# Patient Record
Sex: FEMALE | Race: ASIAN
[De-identification: names, ages, dates, MRNs, and addresses within clinical notes are randomized per-mention and may not be internally consistent; named-entity substitution may affect disease eponyms.]

---

## 2019-10-04 NOTE — PRE-PROCEDURE NOTE/ATTESTATION
Pre-Procedure Note/Attestation


Complete Prior to Procedure


Planned Procedure:  not applicable


Procedure Narrative:


D&C, Hysteroscopy





Indications for Procedure


Pre-Operative Diagnosis:


Uterine prolapse, vaginal bleeding





Attestation


I attest that I discussed the nature of the procedure; its benefits; risks and 

complications; and alternatives (and the risks and benefits of such alternatives

), prior to the procedure, with the patient (or the patient's legal 

representative).





I attest that, if there was a reasonable possibility of needing a blood 

transfusion, the patient (or the patient's legal representative) was given the 

[facility] Services standardized written summary, pursuant 

to the [place] Blood Safety Act ([place] Health and Safety Code # 1645, as 

amended).





I attest that I re-evaluated the patient just prior to the surgery and that 

there has been no change in the patient's H&P, except as documented below:NONE











Simeon Tucker MD Oct 4, 2019 10:14

## 2019-10-04 NOTE — OPERATIVE NOTE - DICTATED
DATE OF OPERATION:  10/04/2019

PREOPERATIVE DIAGNOSES:  Menorrhagia and pelvic prolapse.



POSTOPERATIVE DIAGNOSES:  Menorrhagia and pelvic prolapse.



PROCEDURES PERFORMED:  Video hysteroscopy, dilatation and curettage,

endocervical curettage, and endometrial curettage.



SURGEON:  Simeon Tucker M.D.



ASSISTANT:  No assistant.



ANESTHESIA:  General.



ANESTHESIOLOGIST:  Hank Alonso M.D.



PROCEDURE IN DETAIL:  After all the appropriate consents were signed, the

patient was brought to the operating room and placed on the table in

supine position.  General anesthesia was induced without complication.

The patient was then placed in a dorsal lithotomy position.  Perineum,

vagina, and abdomen were prepped and draped in the usual fashion for the

procedure.  The patient was then examined under anesthesia.  Uterus

appeared to be severely prolapsing with both cystocele and enterocele

visible.  The cervix was now grasped and dilated using Hegar dilators to

Hegar #7.  The procedure then continued with video hysteroscope where

uterine cavity was well visualized.  Both tubal ostia could be identified

and there did not appear to be any submucosal fibroids or polypoid

lesions.  The endometrium appeared to be quite dry.  At this time,

endocervical curettage was performed followed by endometrial curettage.

Both specimens submitted to pathology for evaluation.  At this time,

instruments were removed from the vagina and the cervical os was once

again visualized and found to be hemostatic.  The patient was placed in

the supine position, awakened from general anesthesia, and brought to the

recovery room in excellent condition.









  ______________________________________________

  Simeon Tucker M.D.





DR:  ESTEFANÍA

D:  10/04/2019 11:31

T:  10/04/2019 15:59

JOB#:  [id number]

CC:

## 2019-10-04 NOTE — ANETHESIA PREOPERATIVE EVAL
Anesthesia Pre-op PMH/ROS


General


Date of Evaluation:  Oct 4, 2019


Time of Evaluation:  10:18


Anesthesiologist:  Celeste


ASA Score:  ASA 3


Mallampati Score


Class I : Soft palate, uvula, fauces, pillars visible


Class II: Soft palate, uvula, fauces visible


Class III: Soft palate, base of uvula visible


Class IV: Only hard plate visible


Mallampati Classification:  Class II


Surgeon:  Caitlin


Diagnosis:  Uterine prolaps


Surgical Procedure:  D&C Hysteroscopy


Anesthesia History:  none


Family History:  no anesthesia problems


Allergies:  


Coded Allergies:  


     No Known Allergies (Unverified , 10/3/19)


Patient NPO?:  Yes





Past Medical History


Cardiovascular:  Reports: HTN - stable on pills; 


   Denies: CAD, MI, valve dz, arrhythmia, other


Pulmonary:  Denies: asthma, COPD, MIKI, other


Gastrointestinal/Genitourinary:  Reports: GERD - mild; 


   Denies: CRI, ESRD, other


Neurologic/Psychiatric:  Reports: depression/anxiety; 


   Denies: dementia, CVA, TIA, other


Endocrine:  Reports: DM - on pills pooly controlled; 


   Denies: hypothyroidism, steroids, other


HEENT:  Denies: cataract (L), cataract (R), glaucoma, [place] (L), Ione (R), other


Hematology/Immune:  Denies: anemia, DVT, bleeding disorder, other


Musculoskeletal/Integumentary:  Denies: OA, RA, DJD, DDD, edema, other


PMH Narrative:


as above


PSxH Narrative:


none





Anesthesia Pre-op Phys. Exam


Physician Exam





Last Vital Signs








  Date Time  Temp Pulse Resp B/P (MAP) Pulse Ox O2 Delivery O2 Flow Rate FiO2


 


10/4/19 08:50      Room Air  


 


10/4/19 08:50 96.8 50 20 117/65 100   








Constitutional:  NAD


Neurologic:  CN 2-12 intact


Cardiovascular:  RRR, no M/R/G


Respiratory:  CTA


Gastrointestinal:  S/NT/ND





Airway Exam


Mallampati Score:  Class II


MO:  full


Neck:  flexible


ROM:  full


Teeth:  missing


Dentures:  no upper, no lower





Anesthesia Pre-op A/P


Labs


see chart





Studies


Pre-op Studies:  EKG - NSR





Risk Assessment & Plan


Assessment:


ASA 3


Plan:


GA with LMA


Status Change Before Surgery:  No





Pre-Antibiotics


Drug:  Cefoxetin 1gr.


Given Within 1 Hr of Incision:  Yes


Time Given:  10:50











Hank Alonso MD Oct 4, 2019 11:08

## 2019-10-04 NOTE — IMMEDIATE POST-OP EVALUATION
Immediate Post-Op Evalulation


Immediate Post-Op Evalulation


Procedure:  D&C Hysteroscopy


Date of Evaluation:  Oct 4, 2019


Time of Evaluation:  11:33


IV Fluids:  1000


Blood Products:  none


Estimated Blood Loss:  min


Urinary Output:  200


Blood Pressure Systolic:  104


Blood Pressure Diastolic:  56


Pulse Rate:  48


Respiratory Rate:  20


O2 Sat by Pulse Oximetry:  99


Temperature (Fahrenheit):  97.6


Pain Score (1-10):  1


Nausea:  No


Vomiting:  No


Complications


none


Patient Status:  reacts, patent, none


Hydration Status:  adequate











Hank Alonso MD Oct 4, 2019 11:34

## 2019-10-04 NOTE — BRIEF OPERATIVE NOTE
Immediate Post Operative Note


Operative Note


Pre-op Diagnosis:


Uterine prolapse, vaginal bleeding


Procedure:


Video hysteroscopy, ECC, EMC


Post-op Diagnosis:


Same


Post-op Diagnosis:  same as pre-op


Findings:  consistent w/pre-op dx studies


Surgeon:  Simeon Tucker MD


Anesthesiologist:  ISABELL Alonso MD


Anesthesia:  general


Specimen:  yes - ECC, EMC


Complications:  none


Condition:  stable


Fluids:  LR


Estimated Blood Loss:  none


Drains:  none


Implant(s) used?:  No











Simeon Tucker MD Oct 4, 2019 11:32

## 2019-10-08 NOTE — 48 HOUR POST ANESTHESIA EVAL
Post Anesthesia Evaluation


Procedure:  D&C Hysteroscopy


Date of Evaluation:  Oct 4, 2019


Time of Evaluation:  12:30


Blood Pressure Systolic:  128


0:  72


Pulse Rate:  68


Respiratory Rate:  20


Temperature (Fahrenheit):  97.6


O2 Sat by Pulse Oximetry:  98


Airway:  patent


Nausea:  No


Vomiting:  No


Pain Intensity:  1


Hydration Status:  adequate


Cardiopulmonary Status:


stable


Mental Status/LOC:  patient returned to baseline


Follow-up Care/Observations:


n/a


Post-Anesthesia Complications:


none


Follow-up care needed:  ready to discharge











Hank Alonso MD Oct 8, 2019 09:01

## 2019-11-27 NOTE — NUR
*-* CASE MANAGEMENT NOTES *-*



Please  review information for upcoming case Emilia Feng DOS 12/04/19



CERT # [id number]

1DAY LOS

CPT 94398 Laparoscopy, surgical, with vaginal hysterectomy

VERA MARSHALL

NO PHONE #

FX# [phone]

PER LATONIA MCKEON

[phone]



KATIA OF DR AIKEN'S OFFICE OBTAINING AUTH FOR VAGINAL SLING PROCEDURE. WILL UPDATE ONCE 
APPROVED

## 2019-12-04 ENCOUNTER — HOSPITAL ENCOUNTER (INPATIENT)
Dept: HOSPITAL 72 - SDSOVERFLO | Age: 59
LOS: 58 days | Discharge: HOME | DRG: 743 | End: 2020-01-31
Payer: COMMERCIAL

## 2019-12-04 VITALS — DIASTOLIC BLOOD PRESSURE: 78 MMHG | SYSTOLIC BLOOD PRESSURE: 118 MMHG

## 2019-12-04 VITALS — WEIGHT: 135 LBS | BODY MASS INDEX: 25.49 KG/M2 | HEIGHT: 61 IN

## 2019-12-04 DIAGNOSIS — E78.00: ICD-10-CM

## 2019-12-04 DIAGNOSIS — Z79.82: ICD-10-CM

## 2019-12-04 DIAGNOSIS — R50.9: ICD-10-CM

## 2019-12-04 DIAGNOSIS — E11.9: ICD-10-CM

## 2019-12-04 DIAGNOSIS — N81.3: Primary | ICD-10-CM

## 2019-12-04 DIAGNOSIS — Z79.84: ICD-10-CM

## 2019-12-04 PROCEDURE — 87081 CULTURE SCREEN ONLY: CPT

## 2019-12-04 PROCEDURE — 86850 RBC ANTIBODY SCREEN: CPT

## 2019-12-04 PROCEDURE — 82962 GLUCOSE BLOOD TEST: CPT

## 2019-12-04 PROCEDURE — 86900 BLOOD TYPING SEROLOGIC ABO: CPT

## 2019-12-04 PROCEDURE — 85007 BL SMEAR W/DIFF WBC COUNT: CPT

## 2019-12-04 PROCEDURE — 80048 BASIC METABOLIC PNL TOTAL CA: CPT

## 2019-12-04 PROCEDURE — 85025 COMPLETE CBC W/AUTO DIFF WBC: CPT

## 2019-12-04 PROCEDURE — 86901 BLOOD TYPING SEROLOGIC RH(D): CPT

## 2019-12-04 PROCEDURE — 36415 COLL VENOUS BLD VENIPUNCTURE: CPT

## 2019-12-04 PROCEDURE — 94003 VENT MGMT INPAT SUBQ DAY: CPT

## 2019-12-04 PROCEDURE — 94150 VITAL CAPACITY TEST: CPT

## 2019-12-04 NOTE — ANETHESIA PREOPERATIVE EVAL
Anesthesia Pre-op PMH/ROS


General


Date of Evaluation:  Dec 4, 2019


Anesthesiologist:  Michael


ASA Score:  ASA 3


Mallampati Score


Class I : Soft palate, uvula, fauces, pillars visible


Class II: Soft palate, uvula, fauces visible


Class III: Soft palate, base of uvula visible


Class IV: Only hard plate visible


Mallampati Classification:  Class II


Surgeon:  Caitlin


Diagnosis:  Uterine Prolapse


Surgical Procedure:  Vaginal Hysterectomy


Anesthesia History:  none


Family History:  no anesthesia problems


Allergies:  


Coded Allergies:  


     No Known Allergies (Unverified , 10/3/19)


Medications:  see eMAR


Patient NPO?:  Yes


NPO Date:  Dec 3, 2019


NPO Time:  2340





Past Medical History


Cardiovascular:  Reports: HTN, other - HL


Endocrine:  Reports: DM





Anesthesia Pre-op Phys. Exam


Physician Exam





Last Vital Signs








  Date Time  Temp Pulse Resp B/P (MAP) Pulse Ox O2 Delivery O2 Flow Rate FiO2


 


19 06:49      Room Air  


 


19 06:39 96.7 54 18 118/78 (91) 99   








Constitutional:  NAD


Neurologic:  CN 2-12 intact


Cardiovascular:  RRR


Respiratory:  CTA


Gastrointestinal:  S/NT/ND





Airway Exam


Mallampati Score:  Class II


MO:  full


ROM:  limited


Teeth:  missing


Dentures:  upper, lower





Anesthesia Pre-op A/P


Risk Assessment & Plan


Assessment:


ASA 3


Plan:


GA, SED, GlideScope Go


Status Change Before Surgery:  No





Pre-Antibiotics


Dru Grams Ancef IV


Given Within 1 Hr of Incision:  Yes











Zach Rodriguez MD Dec 4, 2019 06:55

## 2020-01-29 VITALS — SYSTOLIC BLOOD PRESSURE: 92 MMHG | DIASTOLIC BLOOD PRESSURE: 43 MMHG

## 2020-01-29 VITALS — DIASTOLIC BLOOD PRESSURE: 45 MMHG | SYSTOLIC BLOOD PRESSURE: 90 MMHG

## 2020-01-29 VITALS — SYSTOLIC BLOOD PRESSURE: 105 MMHG | DIASTOLIC BLOOD PRESSURE: 49 MMHG

## 2020-01-29 VITALS — SYSTOLIC BLOOD PRESSURE: 92 MMHG | DIASTOLIC BLOOD PRESSURE: 50 MMHG

## 2020-01-29 VITALS — DIASTOLIC BLOOD PRESSURE: 44 MMHG | SYSTOLIC BLOOD PRESSURE: 90 MMHG

## 2020-01-29 VITALS — SYSTOLIC BLOOD PRESSURE: 100 MMHG | DIASTOLIC BLOOD PRESSURE: 50 MMHG

## 2020-01-29 VITALS — SYSTOLIC BLOOD PRESSURE: 105 MMHG | DIASTOLIC BLOOD PRESSURE: 63 MMHG

## 2020-01-29 VITALS — DIASTOLIC BLOOD PRESSURE: 51 MMHG | SYSTOLIC BLOOD PRESSURE: 109 MMHG

## 2020-01-29 VITALS — SYSTOLIC BLOOD PRESSURE: 92 MMHG | DIASTOLIC BLOOD PRESSURE: 44 MMHG

## 2020-01-29 VITALS — DIASTOLIC BLOOD PRESSURE: 49 MMHG | SYSTOLIC BLOOD PRESSURE: 103 MMHG

## 2020-01-29 VITALS — SYSTOLIC BLOOD PRESSURE: 96 MMHG | DIASTOLIC BLOOD PRESSURE: 47 MMHG

## 2020-01-29 VITALS — SYSTOLIC BLOOD PRESSURE: 88 MMHG | DIASTOLIC BLOOD PRESSURE: 42 MMHG

## 2020-01-29 VITALS — SYSTOLIC BLOOD PRESSURE: 87 MMHG | DIASTOLIC BLOOD PRESSURE: 44 MMHG

## 2020-01-29 VITALS — DIASTOLIC BLOOD PRESSURE: 54 MMHG | SYSTOLIC BLOOD PRESSURE: 96 MMHG

## 2020-01-29 VITALS — SYSTOLIC BLOOD PRESSURE: 105 MMHG | DIASTOLIC BLOOD PRESSURE: 55 MMHG

## 2020-01-29 VITALS — SYSTOLIC BLOOD PRESSURE: 100 MMHG | DIASTOLIC BLOOD PRESSURE: 47 MMHG

## 2020-01-29 VITALS — DIASTOLIC BLOOD PRESSURE: 53 MMHG | SYSTOLIC BLOOD PRESSURE: 95 MMHG

## 2020-01-29 VITALS — SYSTOLIC BLOOD PRESSURE: 91 MMHG | DIASTOLIC BLOOD PRESSURE: 45 MMHG

## 2020-01-29 VITALS — SYSTOLIC BLOOD PRESSURE: 93 MMHG | DIASTOLIC BLOOD PRESSURE: 50 MMHG

## 2020-01-29 VITALS — DIASTOLIC BLOOD PRESSURE: 39 MMHG | SYSTOLIC BLOOD PRESSURE: 84 MMHG

## 2020-01-29 LAB
ADD MANUAL DIFF: YES
ERYTHROCYTE [DISTWIDTH] IN BLOOD BY AUTOMATED COUNT: 11.7 % (ref 11.6–14.8)
HCT VFR BLD CALC: 31.4 % (ref 37–47)
HGB BLD-MCNC: 10.5 G/DL (ref 12–16)
MCV RBC AUTO: 80 FL (ref 80–99)
PLATELET # BLD: 312 K/UL (ref 150–450)
RBC # BLD AUTO: 3.91 M/UL (ref 4.2–5.4)
WBC # BLD AUTO: 14.8 K/UL (ref 4.8–10.8)

## 2020-01-29 PROCEDURE — 0JQC3ZZ REPAIR PELVIC REGION SUBCUTANEOUS TISSUE AND FASCIA, PERCUTANEOUS APPROACH: ICD-10-PCS

## 2020-01-29 PROCEDURE — 0UT9FZZ RESECTION OF UTERUS, VIA NATURAL OR ARTIFICIAL OPENING WITH PERCUTANEOUS ENDOSCOPIC ASSISTANCE: ICD-10-PCS

## 2020-01-29 PROCEDURE — 0TSD4ZZ REPOSITION URETHRA, PERCUTANEOUS ENDOSCOPIC APPROACH: ICD-10-PCS

## 2020-01-29 PROCEDURE — 0UT7FZZ RESECTION OF BILATERAL FALLOPIAN TUBES, VIA NATURAL OR ARTIFICIAL OPENING WITH PERCUTANEOUS ENDOSCOPIC ASSISTANCE: ICD-10-PCS

## 2020-01-29 PROCEDURE — 0UQG3ZZ REPAIR VAGINA, PERCUTANEOUS APPROACH: ICD-10-PCS

## 2020-01-29 PROCEDURE — 0USG8ZZ REPOSITION VAGINA, VIA NATURAL OR ARTIFICIAL OPENING ENDOSCOPIC: ICD-10-PCS

## 2020-01-29 PROCEDURE — 0UT2FZZ RESECTION OF BILATERAL OVARIES, VIA NATURAL OR ARTIFICIAL OPENING WITH PERCUTANEOUS ENDOSCOPIC ASSISTANCE: ICD-10-PCS

## 2020-01-29 RX ADMIN — SODIUM CHLORIDE, SODIUM LACTATE, POTASSIUM CHLORIDE, AND CALCIUM CHLORIDE SCH MLS/HR: .6; .31; .03; .02 INJECTION, SOLUTION INTRAVENOUS at 18:54

## 2020-01-29 RX ADMIN — CEFAZOLIN SODIUM SCH MLS/HR: 1 SOLUTION INTRAVENOUS at 22:20

## 2020-01-29 RX ADMIN — INSULIN ASPART SCH UNITS: 100 INJECTION, SOLUTION INTRAVENOUS; SUBCUTANEOUS at 21:00

## 2020-01-29 RX ADMIN — METOPROLOL TARTRATE SCH MG: 50 TABLET, FILM COATED ORAL at 21:00

## 2020-01-29 RX ADMIN — INSULIN ASPART SCH UNITS: 100 INJECTION, SOLUTION INTRAVENOUS; SUBCUTANEOUS at 17:15

## 2020-01-29 NOTE — GENERAL PROGRESS NOTE
Assessment/Plan


Assessment/Plan:


Laparoscopically Assisted Hysterectomy, Bilateral Salpingo-oophorectomy, lysis 

of pelvic adhesions, enterolysis


diabetes


hypertension





PLAN


1. incentive spirometry


2. SCD


3. resume home meds


4. Hydration


5. Pain management


6. discharge once stable with outpatient follow up





Subjective


Allergies:  


Coded Allergies:  


     No Known Allergies (Unverified , 10/3/19)


Subjective


asked to follow post op





Objective





Last 24 Hour Vital Signs








  Date Time  Temp Pulse Resp B/P (MAP) Pulse Ox O2 Delivery O2 Flow Rate FiO2


 


1/29/20 15:25 98.4 60 16 96/47 99 Nasal Cannula 3 


 


1/29/20 15:10  61 15 90/45 99 Nasal Cannula 3 


 


1/29/20 14:55  61 15 87/44 100 Nasal Cannula 3 


 


1/29/20 14:40  60 15 88/42 100 Nasal Cannula 3 


 


1/29/20 14:25  60 22 92/43 99 Nasal Cannula 3 


 


1/29/20 14:10  60 15 92/50 100 Nasal Cannula 3 


 


1/29/20 13:55 98.4 61 20 84/39 100 Nasal Cannula 3 


 


1/29/20 13:40  60 14 91/45 100 Nasal Cannula 3 


 


1/29/20 13:30  62 15 90/44 100 Nasal Cannula 3 


 


1/29/20 13:20  61 15 92/43 100 Nasal Cannula 3 


 


1/29/20 13:05  61 16 92/44 99 Nasal Cannula 3 


 


1/29/20 12:50  60 15 93/50 99 Nasal Cannula 3 


 


1/29/20 12:40  57 17 100/50 99 Nasal Cannula 3 


 


1/29/20 12:30  60 20 105/49 99 Nasal Cannula 3 


 


1/29/20 12:15  64 16 109/51 100 Simple Mask 6 


 


1/29/20 12:05  63 19 103/49 100 Simple Mask 6 


 


1/29/20 11:55  67 17 105/55 100 Simple Mask 6 


 


1/29/20 11:55  72 20  99   


 


1/29/20 11:50  73 18 95/53 100 Simple Mask 6 


 


1/29/20 11:47 97.8 76 24 100/47 100 Simple Mask 6 


 


1/29/20 06:40      Room Air  


 


1/29/20 06:36 97.2 56 18 105/63 (77) 99   

















Intake and Output  


 


 1/28/20 1/29/20





 19:00 07:00


 


  


 


# Voids  1








Laboratory Tests


1/29/20 14:15: 


White Blood Count 14.8H, Red Blood Count 3.91L, Hemoglobin 10.5L, Hematocrit 

31.4L, Mean Corpuscular Volume 80, Mean Corpuscular Hemoglobin 26.7L, Mean 

Corpuscular Hemoglobin Concent 33.3, Red Cell Distribution Width 11.7, Platelet 

Count 312, Mean Platelet Volume 5.9L, Neutrophils (%) (Auto) , Lymphocytes (%) (

Auto) , Monocytes (%) (Auto) , Eosinophils (%) (Auto) , Basophils (%) (Auto) , 

Differential Total Cells Counted 100, Neutrophils % (Manual) 89H, Lymphocytes % 

(Manual) 8L, Monocytes % (Manual) 3, Eosinophils % (Manual) 0, Basophils % (

Manual) 0, Band Neutrophils 0, Platelet Estimate Adequate, Platelet Morphology 

Normal, Hypochromasia 1+


Height (Feet):  5


Height (Inches):  1.00


Weight (Pounds):  135


Objective


WDWN


NAD


clear breath sounds bilaterally without rhonchi or wheeze


[id number] without MRG


NABS nontender no HSM


no CCE


nonfocal











William Acuna MD Jan 29, 2020 17:36

## 2020-01-29 NOTE — BRIEF OPERATIVE NOTE
Immediate Post Operative Note


Operative Note


Pre-op Diagnosis:


Pelvic Prolapse


Procedure:


Laparoscopically Assisted Hysterectomy, Bilateral Salpingo-oophorectomy, lysis 

of pelvic adhesions, enterolysis


Post-op Diagnosis:  same as pre-op


Findings:  consistent w/pre-op dx studies


Surgeon:  Simeon Tucker


Assistant:  Ester Olson


Anesthesiologist:  DAVID Alonso


Anesthesia:  general


Specimen:  yes - Uterus, Cervix, Bilateral Ovaries and tubes


Complications:  none


Condition:  stable


Fluids:  LR @100 ml/hr


Estimated Blood Loss:  minimal


Drains:  none


Packing:  NONE


Implant(s) used?:  No











Simeon Tucker MD Jan 29, 2020 10:52

## 2020-01-29 NOTE — NUR
CASE MANAGEMENT:REVIEW



60 YR OLD FEMALE HERE FOR ELECTIVE SURGERY



SI: VAGINAL PROLAPSE

96.7  54  18  118/78  99% ON RA





IS: TO SURGERY:

CYSTOCELE & RECTOCELE REPAIR

**: TO MED/SURG [facility]

## 2020-01-29 NOTE — NUR
NURSE NOTES:

pt admitted in stable condition.  pt is bleeding normal checked vaginal pad.  surgical site 
is intact.  Pt on cardiac monitor no signs of cardiac or respiratory distress at this time.  
will continue to monitor V/s.  B/P is currently 107/58 it is improving HR 62 and reports no 
pain.  Bed in lowest position and locked.  Call light within reach instructed pt on how to 
use it.

## 2020-01-29 NOTE — ANETHESIA PREOPERATIVE EVAL
Anesthesia Pre-op PMH/ROS


General


Date of Evaluation:  Jan 29, 2020


Time of Evaluation:  07:42


Anesthesiologist:  Celeste


ASA Score:  ASA 2


Mallampati Score


Class I : Soft palate, uvula, fauces, pillars visible


Class II: Soft palate, uvula, fauces visible


Class III: Soft palate, base of uvula visible


Class IV: Only hard plate visible


Mallampati Classification:  Class II


Surgeon:  Caitlin


Diagnosis:  Pelvic prolaps


Surgical Procedure:  Vaginal hysterectomy


Anesthesia History:  none


Family History:  no anesthesia problems


Allergies:  


Coded Allergies:  


     No Known Allergies (Unverified , 10/3/19)


Medications:  see eMAR


Patient NPO?:  Yes


NPO Date:  Jan 29, 2020


NPO Time:  2300





Past Medical History


Cardiovascular:  Reports: HTN - stable on meds; 


   Denies: CAD, MI, valve dz, arrhythmia, other


Pulmonary:  Denies: asthma, COPD, MIKI, other


Gastrointestinal/Genitourinary:  Reports: GERD; 


   Denies: CRI, ESRD, other


Neurologic/Psychiatric:  Reports: depression/anxiety; 


   Denies: dementia, CVA, TIA, other


Endocrine:  Reports: DM - on pills; 


   Denies: hypothyroidism, steroids, other


HEENT:  Denies: cataract (L), cataract (R), glaucoma, [place] IRA (L), [place] IRA (R), other


Hematology/Immune:  Denies: anemia, DVT, bleeding disorder, other


Musculoskeletal/Integumentary:  Denies: OA, RA, DJD, DDD, edema, other


PMH Narrative:


as above


PSxH Narrative:


None





Anesthesia Pre-op Phys. Exam


Physician Exam





Last Vital Signs








  Date Time  Temp Pulse Resp B/P (MAP) Pulse Ox O2 Delivery O2 Flow Rate FiO2


 


1/29/20 06:40      Room Air  


 


1/29/20 06:36 97.2 56 18 105/63 (77) 99   








Constitutional:  NAD


Neurologic:  CN 2-12 intact


Cardiovascular:  RRR, no M/R/G


Respiratory:  CTA


Gastrointestinal:  S/NT/ND





Airway Exam


Mallampati Score:  Class II


MO:  full


Neck:  flexible


Teeth:  missing


Dentures:  upper, lower











Hank Alonso MD Jan 29, 2020 07:44

## 2020-01-29 NOTE — BRIEF OPERATIVE NOTE
Immediate Post Operative Note


Operative Note


Pre-op Diagnosis:


vaginal prolapse


Procedure:


cystocele and rectocele repair colposuspesion vaginal sling cystoscopy


Post-op Diagnosis:


same


Post-op Diagnosis:  same as pre-op


Surgeon:  Kirk Russell


Anesthesia:  general


Specimen:  none


Complications:  none


Condition:  stable


Fluids:  1000


Estimated Blood Loss:  minimal


Drains:  none


Implant(s) used?:  No











Anthony Russell MD Jan 29, 2020 09:54

## 2020-01-29 NOTE — PRE-PROCEDURE NOTE/ATTESTATION
Pre-Procedure Note/Attestation


Complete Prior to Procedure


Planned Procedure:  bilateral


Procedure Narrative:


Laparoscopically assisted hysterectomy, bilateral salpingo-oophorectomy





Indications for Procedure


Pre-Operative Diagnosis:


Pelvic Prolapse





Attestation


I attest that I discussed the nature of the procedure; its benefits; risks and 

complications; and alternatives (and the risks and benefits of such alternatives

), prior to the procedure, with the patient (or the patient's legal 

representative).





I attest that, if there was a reasonable possibility of needing a blood 

transfusion, the patient (or the patient's legal representative) was given the 

[facility] of [facility] Services standardized written summary, pursuant 

to the [place] Blood Safety Act ([place] Health and Safety Code # 1645, as 

amended).





I attest that I re-evaluated the patient just prior to the surgery and that 

there has been no change in the patient's H&P, except as documented below:











Simeon Tucker MD Jan 29, 2020 07:37

## 2020-01-29 NOTE — IMMEDIATE POST-OP EVALUATION
Immediate Post-Op Evalulation


Immediate Post-Op Evalulation


Procedure:  Lap assysted vaginal hysterectomy, recto and cystocele repair


Date of Evaluation:  Jan 29, 2020


Time of Evaluation:  11:54


IV Fluids:  1700


Blood Products:  none


Estimated Blood Loss:  150


Urinary Output:  250


Blood Pressure Systolic:  104


Blood Pressure Diastolic:  56


Pulse Rate:  72


Respiratory Rate:  20


O2 Sat by Pulse Oximetry:  99


Temperature (Fahrenheit):  97.8


Pain Score (1-10):  1


Nausea:  No


Vomiting:  No


Complications


none


Patient Status:  reacts, patent, extubated, none


Hydration Status:  adequate











Hank Alonso MD Jan 29, 2020 11:55

## 2020-01-29 NOTE — NUR
NURSE NOTES:

Got report from Niki STILL. Pt in stable condition. Denies any pain. No s/s of distress or 
discomfort noted. Pt resting in bed comfortably. Bed in low and locked position, call light 
within reach, bedside table within reach. Continue to monitor.

## 2020-01-29 NOTE — NUR
HAND-OFF: 

Report given to Tim/RN, pt in stable condition.  Endorsed to RN that pad needs to be 
removed in the morning but follow is to stay until doctor's orders to DC.

## 2020-01-29 NOTE — PRE-PROCEDURE NOTE/ATTESTATION
Pre-Procedure Note/Attestation


Complete Prior to Procedure


Planned Procedure:  not applicable


Procedure Narrative:


cystocele repair vaginal sling rectocele repair colposuspension cystoscopy





Indications for Procedure


Pre-Operative Diagnosis:


vaginal prolapse





Attestation


I attest that I discussed the nature of the procedure; its benefits; risks and 

complications; and alternatives (and the risks and benefits of such alternatives

), prior to the procedure, with the patient (or the patient's legal 

representative).





I attest that, if there was a reasonable possibility of needing a blood 

transfusion, the patient (or the patient's legal representative) was given the 

[facility] of Health Services standardized written summary, pursuant 

to the [place] Blood Safety Act ([place] Health and Safety Code # 1645, as 

amended).





I attest that I re-evaluated the patient just prior to the surgery and that 

there has been no change in the patient's H&P, except as documented below:











Anthony Russell MD Jan 29, 2020 07:44

## 2020-01-30 VITALS — DIASTOLIC BLOOD PRESSURE: 55 MMHG | SYSTOLIC BLOOD PRESSURE: 99 MMHG

## 2020-01-30 VITALS — SYSTOLIC BLOOD PRESSURE: 106 MMHG | DIASTOLIC BLOOD PRESSURE: 55 MMHG

## 2020-01-30 VITALS — DIASTOLIC BLOOD PRESSURE: 63 MMHG | SYSTOLIC BLOOD PRESSURE: 115 MMHG

## 2020-01-30 VITALS — SYSTOLIC BLOOD PRESSURE: 127 MMHG | DIASTOLIC BLOOD PRESSURE: 75 MMHG

## 2020-01-30 VITALS — DIASTOLIC BLOOD PRESSURE: 62 MMHG | SYSTOLIC BLOOD PRESSURE: 122 MMHG

## 2020-01-30 VITALS — DIASTOLIC BLOOD PRESSURE: 74 MMHG | SYSTOLIC BLOOD PRESSURE: 126 MMHG

## 2020-01-30 LAB
ANION GAP SERPL CALC-SCNC: 8 MMOL/L (ref 5–15)
BUN SERPL-MCNC: 6 MG/DL (ref 7–18)
CALCIUM SERPL-MCNC: 8.1 MG/DL (ref 8.5–10.1)
CHLORIDE SERPL-SCNC: 107 MMOL/L (ref 98–107)
CO2 SERPL-SCNC: 26 MMOL/L (ref 21–32)
CREAT SERPL-MCNC: 0.7 MG/DL (ref 0.55–1.3)
POTASSIUM SERPL-SCNC: 3.8 MMOL/L (ref 3.5–5.1)
SODIUM SERPL-SCNC: 141 MMOL/L (ref 136–145)

## 2020-01-30 RX ADMIN — GLIPIZIDE SCH MG: 5 TABLET ORAL at 06:12

## 2020-01-30 RX ADMIN — METFORMIN HYDROCHLORIDE SCH MG: 500 TABLET ORAL at 18:16

## 2020-01-30 RX ADMIN — SODIUM CHLORIDE, SODIUM LACTATE, POTASSIUM CHLORIDE, AND CALCIUM CHLORIDE SCH MLS/HR: .6; .31; .03; .02 INJECTION, SOLUTION INTRAVENOUS at 02:43

## 2020-01-30 RX ADMIN — INSULIN ASPART SCH UNITS: 100 INJECTION, SOLUTION INTRAVENOUS; SUBCUTANEOUS at 16:30

## 2020-01-30 RX ADMIN — INSULIN ASPART SCH UNITS: 100 INJECTION, SOLUTION INTRAVENOUS; SUBCUTANEOUS at 12:20

## 2020-01-30 RX ADMIN — CEFAZOLIN SODIUM SCH MLS/HR: 1 SOLUTION INTRAVENOUS at 06:12

## 2020-01-30 RX ADMIN — INSULIN ASPART SCH UNITS: 100 INJECTION, SOLUTION INTRAVENOUS; SUBCUTANEOUS at 21:00

## 2020-01-30 RX ADMIN — METOPROLOL TARTRATE SCH MG: 50 TABLET, FILM COATED ORAL at 21:49

## 2020-01-30 RX ADMIN — SODIUM CHLORIDE, SODIUM LACTATE, POTASSIUM CHLORIDE, AND CALCIUM CHLORIDE SCH MLS/HR: .6; .31; .03; .02 INJECTION, SOLUTION INTRAVENOUS at 14:54

## 2020-01-30 RX ADMIN — GLIPIZIDE SCH MG: 5 TABLET ORAL at 11:38

## 2020-01-30 RX ADMIN — SODIUM CHLORIDE, SODIUM LACTATE, POTASSIUM CHLORIDE, AND CALCIUM CHLORIDE SCH MLS/HR: .6; .31; .03; .02 INJECTION, SOLUTION INTRAVENOUS at 23:04

## 2020-01-30 RX ADMIN — METOPROLOL TARTRATE SCH MG: 50 TABLET, FILM COATED ORAL at 09:00

## 2020-01-30 RX ADMIN — INSULIN ASPART SCH UNITS: 100 INJECTION, SOLUTION INTRAVENOUS; SUBCUTANEOUS at 06:25

## 2020-01-30 RX ADMIN — CEFAZOLIN SODIUM SCH MLS/HR: 1 SOLUTION INTRAVENOUS at 14:55

## 2020-01-30 RX ADMIN — CEFAZOLIN SODIUM SCH MLS/HR: 1 SOLUTION INTRAVENOUS at 21:49

## 2020-01-30 NOTE — NUR
NURSE NOTES:

Received report from CHEKO Stoll.  Patient sleeping in supine position, resting comfortably, 
oxygen at 2 liters nasal cannula, no signs or symptoms of pain, bed in lowest position, call 
light within reach, in no apparent distress.

## 2020-01-30 NOTE — 48 HOUR POST ANESTHESIA EVAL
Post Anesthesia Evaluation


Procedure:  Lap assysted vaginal hysterectomy, recto and cystocele repair


Date of Evaluation:  Jan 30, 2020


Time of Evaluation:  14:05


Blood Pressure Systolic:  124


0:  72


Pulse Rate:  68


Respiratory Rate:  58


Temperature (Fahrenheit):  97.6


O2 Sat by Pulse Oximetry:  98


Airway:  patent


Nausea:  No


Vomiting:  No


Pain Intensity:  1


Hydration Status:  adequate


Cardiopulmonary Status:


stable


Mental Status/LOC:  patient returned to baseline


Follow-up Care/Observations:


n/a


Post-Anesthesia Complications:


none


Follow-up care needed:  N/A











Hank Alonso MD Jan 30, 2020 14:06

## 2020-01-30 NOTE — NUR
CASE MANAGEMENT:REVIEW



1/30/20

SI: POD #1

S/P CYSTOCELE & RECTOCELE REPAIR

98.1   75  18  127/75  96% ON RA



IS: IV ANCEF Q8HRS

IV LR @ 100/HR

NORVASC PO QD

ASA PO QD

GLIPIZIDE PO BID

LOPRESSOR PO Q12

TORADOL IM Q6HRS PRN

**: MED/SURG [facility]

## 2020-01-30 NOTE — NUR
TRANSFER TO FLOOR:

Patient transferred to [facility], per MD.   Report given to CHEKO Farias.  Belongings and 
medications given to CHEKO Hancock. Family and or S/O informed of transfer by patient.

## 2020-01-30 NOTE — NUR
NURSE NOTES:

Patient AOx4. Son at bedside. No complaints of pain at this time. No s/s distress noted. 
Three abdominal surgical sites noted, dry and intact. Smith in place, draining yellow urine 
by gravity. Bed in lowest position, call light within reach, will continue to monitor.

## 2020-01-30 NOTE — GENERAL PROGRESS NOTE
Assessment/Plan


Assessment/Plan:


Laparoscopically Assisted Hysterectomy, Bilateral Salpingo-oophorectomy, lysis 

of pelvic adhesions, enterolysis


diabetes


hypertension





PLAN


1. incentive spirometry


2. SCD


3. maintain home meds


4. Hydration


5. Pain management


6. discharge once stable with outpatient follow up





Subjective


Allergies:  


Coded Allergies:  


     No Known Allergies (Unverified , 10/3/19)


Subjective


sable post op care noted





Objective





Last 24 Hour Vital Signs








  Date Time  Temp Pulse Resp B/P (MAP) Pulse Ox O2 Delivery O2 Flow Rate FiO2


 


1/30/20 14:06  68 58  98   


 


1/30/20 12:00 98.1 75 18 127/75 (92) 96   


 


1/30/20 09:00  65  106/55    


 


1/30/20 09:00  65  106/55    


 


1/30/20 09:00      Room Air  


 


1/30/20 08:00  61      


 


1/30/20 08:00 98.1 65 18 106/55 (72) 98   


 


1/30/20 07:06 98.2       


 


1/30/20 04:00 98.2 66 16 126/74 (91) 97   


 


1/30/20 04:00       2.0 


 


1/30/20 04:00  58      


 


1/30/20 01:09      Nasal Cannula 2.0 


 


1/30/20 01:00      Nasal Cannula 2.0 


 


1/30/20 00:00 98.6 60 16 99/55 (70) 98   


 


1/30/20 00:00  55      


 


1/29/20 21:00  61  96/54    


 


1/29/20 21:00      Nasal Cannula 2.0 


 


1/29/20 20:00  52      


 


1/29/20 20:00       2.0 


 


1/29/20 20:00 98.1 61 16 96/54 (68) 96   


 


1/29/20 19:56      Nasal Cannula 2.0 

















Intake and Output  


 


 1/29/20 1/30/20





 19:00 07:00


 


Intake Total 3700 ml 50 ml


 


Output Total 520 ml 


 


Balance 3180 ml 50 ml


 


  


 


Intake IV Total 3700 ml 50 ml


 


Output Urine Total 370 ml 


 


Estimated Blood Loss 150 ml 


 


# Voids  1








Laboratory Tests


1/30/20 10:30: 


Sodium Level 141, Potassium Level 3.8, Chloride Level 107, Carbon Dioxide Level 

26, Anion Gap 8, Blood Urea Nitrogen 6L, Creatinine 0.7, Estimat Glomerular 

Filtration Rate > 60, Glucose Level 191H, Calcium Level 8.1L


Height (Feet):  5


Height (Inches):  1.00


Weight (Pounds):  135


Objective


WDWN


NAD


clear breath sounds bilaterally without rhonchi or wheeze


[id number] without MRG


NABS nontender no HSM


no CCE


nonfocal











William Acuna MD Jan 30, 2020 16:16

## 2020-01-31 VITALS — SYSTOLIC BLOOD PRESSURE: 131 MMHG | DIASTOLIC BLOOD PRESSURE: 68 MMHG

## 2020-01-31 VITALS — SYSTOLIC BLOOD PRESSURE: 125 MMHG | DIASTOLIC BLOOD PRESSURE: 63 MMHG

## 2020-01-31 VITALS — DIASTOLIC BLOOD PRESSURE: 62 MMHG | SYSTOLIC BLOOD PRESSURE: 127 MMHG

## 2020-01-31 VITALS — SYSTOLIC BLOOD PRESSURE: 135 MMHG | DIASTOLIC BLOOD PRESSURE: 65 MMHG

## 2020-01-31 VITALS — SYSTOLIC BLOOD PRESSURE: 123 MMHG | DIASTOLIC BLOOD PRESSURE: 80 MMHG

## 2020-01-31 RX ADMIN — METFORMIN HYDROCHLORIDE SCH MG: 500 TABLET ORAL at 09:20

## 2020-01-31 RX ADMIN — SODIUM CHLORIDE, SODIUM LACTATE, POTASSIUM CHLORIDE, AND CALCIUM CHLORIDE SCH MLS/HR: .6; .31; .03; .02 INJECTION, SOLUTION INTRAVENOUS at 18:48

## 2020-01-31 RX ADMIN — METFORMIN HYDROCHLORIDE SCH MG: 500 TABLET ORAL at 18:49

## 2020-01-31 RX ADMIN — GLIPIZIDE SCH MG: 5 TABLET ORAL at 11:35

## 2020-01-31 RX ADMIN — SODIUM CHLORIDE, SODIUM LACTATE, POTASSIUM CHLORIDE, AND CALCIUM CHLORIDE SCH MLS/HR: .6; .31; .03; .02 INJECTION, SOLUTION INTRAVENOUS at 08:42

## 2020-01-31 RX ADMIN — CEFAZOLIN SODIUM SCH MLS/HR: 1 SOLUTION INTRAVENOUS at 06:07

## 2020-01-31 RX ADMIN — INSULIN ASPART SCH UNITS: 100 INJECTION, SOLUTION INTRAVENOUS; SUBCUTANEOUS at 11:30

## 2020-01-31 RX ADMIN — CEFAZOLIN SODIUM SCH MLS/HR: 1 SOLUTION INTRAVENOUS at 14:14

## 2020-01-31 RX ADMIN — METOPROLOL TARTRATE SCH MG: 50 TABLET, FILM COATED ORAL at 09:20

## 2020-01-31 RX ADMIN — INSULIN ASPART SCH UNITS: 100 INJECTION, SOLUTION INTRAVENOUS; SUBCUTANEOUS at 16:30

## 2020-01-31 RX ADMIN — INSULIN ASPART SCH UNITS: 100 INJECTION, SOLUTION INTRAVENOUS; SUBCUTANEOUS at 06:15

## 2020-01-31 RX ADMIN — GLIPIZIDE SCH MG: 5 TABLET ORAL at 06:07

## 2020-01-31 NOTE — NUR
NURSE NOTES:

Spoke with Dr. Acuna regarding brown in place, per armando FARRELL to keep brown in and to follow 
up with Dr. Tucker regarding removal.

## 2020-01-31 NOTE — NUR
NURSE NOTES:

16Fr brown catheter inserted to gravity drainage, draining clear, yellow urine, patient 
tolerated insertion well. Will endorse discharge to night shift nurse.

## 2020-01-31 NOTE — NUR
HAND-OFF: 

Report given to Christopher STILL. Endorsed to discharge patient and provide discharge teaching.

## 2020-01-31 NOTE — GENERAL PROGRESS NOTE
Assessment/Plan


Assessment/Plan:


Laparoscopically Assisted Hysterectomy, Bilateral Salpingo-oophorectomy, lysis 

of pelvic adhesions, enterolysis


diabetes


hypertension


post op fevers





PLAN


1. incentive spirometry


2. SCD


3. maintain home meds


4. Hydration


5. Pain management


6. discharge per gyn





impression, plan, and exam edited and reviewed in detail


care discussed with RN





Subjective


Allergies:  


Coded Allergies:  


     No Known Allergies (Unverified , 10/3/19)


Subjective


sable post op care noted


low grade fevers





Objective





Last 24 Hour Vital Signs








  Date Time  Temp Pulse Resp B/P (MAP) Pulse Ox O2 Delivery O2 Flow Rate FiO2


 


1/31/20 04:23 97.3       


 


1/31/20 04:22  62 18 135/65 (88) 97   


 


1/31/20 01:09 100.3       


 


1/31/20 00:57  67 18 127/62 (83) 98   


 


1/30/20 22:19      Room Air  


 


1/30/20 21:49  74  115/63    


 


1/30/20 20:00 99.1 74 18 115/63 (80) 97   


 


1/30/20 16:00 99.9 67 18 122/62 (82) 94   


 


1/30/20 14:06  68 58  98   


 


1/30/20 12:00 98.1 75 18 127/75 (92) 96   


 


1/30/20 09:00  65  106/55    


 


1/30/20 09:00  65  106/55    


 


1/30/20 09:00      Room Air  

















Intake and Output  


 


 1/30/20 1/31/20





 19:00 07:00


 


Intake Total 1150 ml 1210 ml


 


Output Total 2150 ml 2000 ml


 


Balance -1000 ml -790 ml


 


  


 


Intake Oral 300 ml 360 ml


 


IV Total 850 ml 850 ml


 


Output Urine Total 2150 ml 2000 ml








Laboratory Tests


1/30/20 10:30: 


Sodium Level 141, Potassium Level 3.8, Chloride Level 107, Carbon Dioxide Level 

26, Anion Gap 8, Blood Urea Nitrogen 6L, Creatinine 0.7, Estimat Glomerular 

Filtration Rate > 60, Glucose Level 191H, Calcium Level 8.1L


Height (Feet):  5


Height (Inches):  1.00


Weight (Pounds):  135


Objective


WDWN


NAD


clear breath sounds bilaterally without rhonchi or wheeze


[id number] without MRG


NABS nontender no HSM


no CCE


nonfocal











William Acuna MD Jan 31, 2020 08:32

## 2020-01-31 NOTE — NUR
NURSE NOTES:

Smith catheter removed per MD order, patient tolerated well. Patient educated to inform RN 
when she needs to void.

## 2020-01-31 NOTE — NUR
NURSE NOTES:

Received call from Dr. Tucker. MD ordered to keep vaginal packing and brown in place until he 
comes in to see the patient.

## 2020-01-31 NOTE — NUR
NURSE NOTES:

Received call from Dr. Tucker. MD gave orders, orders read back and entered. Will carry out.

## 2020-01-31 NOTE — OPERATIVE NOTE - DICTATED
DATE OF OPERATION:  01/29/2020

PREOPERATIVE DIAGNOSIS:  Complete vaginal prolapse.



POSTOPERATIVE DIAGNOSIS:  Complete vaginal prolapse.



PROCEDURES:

1. Transvaginal cystocele repair.

2. Rectocele repair.

3. Colposuspension.

4. Vaginal wall sling.

5. Cystoscopy.



OPERATED BY:  Anthony Russell M.D.



ANESTHESIA:  General.



FINDINGS:  Include a large anterior and posterior vagina.



INDICATION FOR SURGERY:  The patient had a complete vaginal prolapse with

uterine anterior and posterior wall prolapses. Urodynamics confirmed _____

as well as integrity of the bladder.  Treatment options were explained to

her in great length including all potential complications.  She signed the

consent.



PROCEDURE IN DETAIL:  She was brought to the operating room, placed in

lithotomy position.  Dr. Tucker completed laparoscopically assisted

hysterectomy.  After that, bladder was  from the vaginal wall

using sharp and blunt dissection to expose the retrovesical space.  Using

Capio device, sutures were placed in the sacral spinal muscles on both

sides of the bladder.  Vaginal mucosa was treated, sutures were placed

through the body of the vaginal wall and suspension was performed with

excellent vaginal elevation.  Rectocele was also repaired in the standard

fashion.  Vaginal wall sling was placed in the _____.  Wound was copiously

irrigated.  Cystoscopy showed no evidence of bladder perforation, ejection

easily coming from both ureteral orifices.  Estimated blood loss was

approximately 50 mL.  The vagina was closed with running 0 Vicryl suture

and the vaginal packing was placed.  Sponge count and instrument count

were correct.  The patient was transferred to the recovery room in stable

condition.  No evidence of complications.









  ______________________________________________

  Anthony Russell M.D.





DR:  LANI

D:  01/31/2020 15:44

T:  01/31/2020 21:50

JOB#:  [id number]

CC:

## 2020-01-31 NOTE — NUR
NURSE NOTES:

Reviewed home medications with Dr. Armand MD ordered to resume all home medications except 
ASA which MD ordered to instruct patient to resume on 2/4/2020. Orders entered.

## 2020-01-31 NOTE — NUR
NURSE NOTES:

Called Dr. Tucker to clarify whether MD wants brown and remaining vaginal packing removed. Saw 
order from Dr. Russell from 1/29/20 to remove vag packing at 6AM, called Dr. Tucker to 
clarify order before removing remaining packing, noted some packing still coming out of 
vaginal opening, noted nursing note from 1/30/20 that reported vaginal packing was removed, 
still small amount of packing present. Left message with MD's , awaiting 
callback.

## 2020-01-31 NOTE — NUR
NURSE NOTES:

Patient voided four times since brown catheter removal, total of 400mL of blood tinged 
urine, checked post void residual. Bladder scan showed 398mL in bladder. Called Dr. Tucker and 
informed MD of PVR, MD stated he will follow up with Dr. Russell and call back with further 
orders.

## 2020-01-31 NOTE — NUR
NURSE NOTES:

Received report from Melanie STILL. Patient is awake and oriented, no acute distress noted, 
reporting no pain at this time, surgical site clean and dry. Smith to gravity drainage. IVF 
running per order. Patient updated on plan of care for the day. Side rails upx2, bed low and 
locked, call light within reach.

## 2020-01-31 NOTE — NUR
NURSE NOTES:



Received report from Rose STILL. Pt is a/o x 4. Son at bedside. Smith cath in draining to 
gravity. IV is patent and intact. No c/o pain or distress at this time. Bed is lowest 
position. Will continue to monitor.

## 2020-01-31 NOTE — NUR
NURSE NOTES:



Provided discharge education and follow up appointment.  Belonging checked with patient and 
signed by patient. IV removed and given dressing. patient in brown cath, given education 
regarding brown cath care, how to change bag, and clean. patient down on W/C assisting RN 
and Son. safely discharged with son.

## 2020-01-31 NOTE — NUR
CASE MANAGEMENT:REVIEW



1/31/20

SI: POD #2

S/P CYSTOCELE & RECTOCELE REPAIR

98.4   60   16   131/68  97% ON RA

WBC 14.8 H/H 10.5/31.4 CA+ 8.1  



IS: IV ANCEF Q8HRS

IV LR @ 100/HR

NORVASC PO QD

ASA PO QD

GLIPIZIDE PO BID

LOPRESSOR PO Q12

TORADOL IM Q6HRS PRN

METFORMIN PO BID







**: MED/SURG 3 EAST



PLAN:

START POST VOID RESIDUAL 

CONT IS 

DC PLANNING

## 2020-02-02 NOTE — DISCHARGE SUMMARY
Discharge Summary


Discharge Summary


_


DATE OF ADMISSION: 01/29/2020


DATE OF DISCHARGE: 01/31/2020





SURGEON: 


Dr. Simeon Russell





BRIEF HOSPITAL COURSE:


Patient is a 60-year-old female, with vaginal prolapse who was admitted and 

underwent laparoscopic hysterectomy, bilateral salpingo-oophorectomy, 

enterolysis with lysis of adhesions by Dr. Tucker.  Surgery was done by Dr. Russell who performed transvaginal cystocele repair, rectocele repair, 

colposuspension, vaginal wall sling and cystoscopy.  She tolerated procedure 

well. Surgery was uneventful.  Post-operatively, patient was admitted for post-

op care.  She was placed on SCDs for DVT prophylaxis and was encouraged use of 

incentive spirometer.  She was resumed on antihypertensives.  Blood glucose was 

monitored.  She was given metformin and Glucophage.  Patient was given pain 

management.  She had low-grade fever.  She eventually defervesced.  Diet was 

advanced.  Vaginal packing was removed.  Patient was ambulating well with good 

pain control and was tolerating diet.  Patient was eventually cleared for 

discharge home.  She was discharged with a Smith catheter connected to a leg 

bag.  Smith care instructed.  Patient to follow-up on 02/03/2020 at Dr. Russell

's office.  To resume aspirin on 02/04/20.





PREOPERATIVE DIAGNOSIS:  Complete vaginal prolapse.





POSTOPERATIVE DIAGNOSIS:  Complete vaginal prolapse.





PROCEDURES:


1. Transvaginal cystocele repair.


2. Rectocele repair.


3. Colposuspension.


4. Vaginal wall sling.


5. Cystoscopy.


6. Laparoscopically Assisted Hysterectomy, Bilateral Salpingo-oophorectomy, 

lysis of pelvic adhesions, enterolysis


(Refer to Operative Report)





DISCHARGE DISPOSITION: Patient was discharged home.





DISCHARGE MEDICATIONS: Refer to Medication Reconciliation Sheet.





DISCHARGE INSTRUCTIONS: Post-op instructions given. Follow-up in a week.








I have been assigned to complete a DC summary on this account, I was not 

involved with the patient's management.--BOONE Maloney Jacqueline Robles NP Feb 2, 2020 09:38

## 2020-02-03 NOTE — NUR
*-* INSURANCE *-*



ALL CLINICALS AND REVIEWS HAVE BEEN FAXED TO:



[facility]

NO PHONE #

VS# [phone]

PER LATONIA MCKEON

[phone]